# Patient Record
Sex: FEMALE | Race: WHITE | NOT HISPANIC OR LATINO | URBAN - METROPOLITAN AREA
[De-identification: names, ages, dates, MRNs, and addresses within clinical notes are randomized per-mention and may not be internally consistent; named-entity substitution may affect disease eponyms.]

---

## 2020-09-22 ENCOUNTER — TELEPHONE (OUTPATIENT)
Dept: OBSTETRICS AND GYNECOLOGY | Facility: CLINIC | Age: 50
End: 2020-09-22

## 2020-09-22 NOTE — TELEPHONE ENCOUNTER
Pt called for breast ultrasound results. Sent by STEVE Solo for L dx breast ultrasound-at [facility].     Spoke with breast imaging at [place]. Will fax results.     Verbal given-results dominant cyst L breast 3 oclock position, consistent with mammogram findings, resume regular screening mammogram, birads 2, benign.     Pt notified. She VU.

## 2021-07-06 ENCOUNTER — APPOINTMENT (OUTPATIENT)
Dept: WOMENS IMAGING | Facility: HOSPITAL | Age: 51
End: 2021-07-06

## 2021-07-06 PROCEDURE — 77063 BREAST TOMOSYNTHESIS BI: CPT | Performed by: RADIOLOGY

## 2021-07-06 PROCEDURE — 77067 SCR MAMMO BI INCL CAD: CPT | Performed by: RADIOLOGY

## 2022-07-07 ENCOUNTER — APPOINTMENT (OUTPATIENT)
Dept: WOMENS IMAGING | Facility: HOSPITAL | Age: 52
End: 2022-07-07

## 2022-07-07 PROCEDURE — 77067 SCR MAMMO BI INCL CAD: CPT | Performed by: RADIOLOGY

## 2022-07-07 PROCEDURE — 77063 BREAST TOMOSYNTHESIS BI: CPT | Performed by: RADIOLOGY

## 2024-07-26 ENCOUNTER — OFFICE VISIT (OUTPATIENT)
Age: 54
End: 2024-07-26
Payer: COMMERCIAL

## 2024-07-26 ENCOUNTER — APPOINTMENT (OUTPATIENT)
Dept: WOMENS IMAGING | Facility: HOSPITAL | Age: 54
End: 2024-07-26
Payer: COMMERCIAL

## 2024-07-26 VITALS
BODY MASS INDEX: 25.61 KG/M2 | WEIGHT: 150 LBS | SYSTOLIC BLOOD PRESSURE: 118 MMHG | HEIGHT: 64 IN | DIASTOLIC BLOOD PRESSURE: 74 MMHG

## 2024-07-26 DIAGNOSIS — I87.302 VENOUS HYPERTENSION OF LEFT LOWER EXTREMITY: Primary | ICD-10-CM

## 2024-07-26 PROCEDURE — G0279 TOMOSYNTHESIS, MAMMO: HCPCS | Performed by: RADIOLOGY

## 2024-07-26 PROCEDURE — 77065 DX MAMMO INCL CAD UNI: CPT | Performed by: RADIOLOGY

## 2024-07-26 PROCEDURE — 77061 BREAST TOMOSYNTHESIS UNI: CPT | Performed by: RADIOLOGY

## 2024-07-26 PROCEDURE — 76642 ULTRASOUND BREAST LIMITED: CPT | Performed by: RADIOLOGY

## 2024-07-26 RX ORDER — ROSUVASTATIN CALCIUM 5 MG/1
1 TABLET, COATED ORAL DAILY
COMMUNITY
Start: 2024-06-02

## 2024-07-26 RX ORDER — IBUPROFEN 200 MG
400 TABLET ORAL EVERY 6 HOURS PRN
COMMUNITY
End: 2024-07-26

## 2024-07-26 RX ORDER — LISDEXAMFETAMINE DIMESYLATE 60 MG/1
60 CAPSULE ORAL EVERY MORNING
COMMUNITY
Start: 2024-07-15

## 2024-07-26 RX ORDER — VENLAFAXINE HYDROCHLORIDE 150 MG/1
CAPSULE, EXTENDED RELEASE ORAL
COMMUNITY
Start: 2024-07-07

## 2024-07-26 RX ORDER — KETOCONAZOLE 20 MG/ML
SHAMPOO TOPICAL
COMMUNITY
Start: 2024-06-07

## 2024-07-26 NOTE — PROGRESS NOTES
"Chief Complaint  News Patient  (LLE Varicose Veins that cause cramping, pain, burning to touch. )    Subjective      History of Present Illness  Tasha Stewart presents to [facility] as a new patient with complaints of painful varicose veins in the left leg.      Past History:  Medical History: has no past medical history on file.   Surgical History: has no past surgical history on file.   Family History: family history is not on file.   Social History: reports that she has never smoked. She has never used smokeless tobacco. She reports current alcohol use of about 15.0 standard drinks of alcohol per week. She reports that she does not use drugs.    (Not in a hospital admission)     Allergies: Patient has no known allergies.     Tasha Stewart  reports that she has never smoked. She has never used smokeless tobacco..       Objective   Vital Signs:  /74   Ht 162.6 cm (64\")   Wt 68 kg (150 lb)   BMI 25.75 kg/m²   Estimated body mass index is 25.75 kg/m² as calculated from the following:    Height as of this encounter: 162.6 cm (64\").    Weight as of this encounter: 68 kg (150 lb).           Physical Exam  Vitals reviewed.   Constitutional:       Appearance: Normal appearance. She is obese.   Cardiovascular:      Rate and Rhythm: Normal rate and regular rhythm.   Pulmonary:      Breath sounds: Normal breath sounds.   Musculoskeletal:         General: Normal range of motion.   Neurological:      General: No focal deficit present.      Mental Status: She is alert.        Venous Right leg:Telangiectasia  Reticular Vein(s)  CEAP Classification right le    Venous Left leg:Telangiectasia  Reticular Vein(s)  Varicose Veins  CEAP Classification left le    Result Review :                     Assessment and Plan     Diagnoses and all orders for this visit:    1. Venous hypertension of left lower extremity (Primary)  -     Venous w Reflux Lower Extremity - Unilateral CAR; " Future    Tasha Stewart is a 53 y.o. female who presents today as a new patient with complaints of painful varicose veins to her left posterior lateral knee and thigh.  She first noticed these approximately 2 years ago and began developing discomfort approximately 3 months ago.  She describes the pain as cramping, aching, and restlessness.  This is worse with prolonged standing and traveling.  She has not yet tried compression stockings.  She has never had any surgeries or traumas to her legs.  No history of DVT or superficial thrombophlebitis.  She does report a genetic component in which her paternal grandmother, several aunts, and her daughter have all had varicose veins.  In fact her daughter has been seen in our office and has undergone several EVLA's.    Discussed the natural history and pathophysiology of venous insufficiency as well as varicose veins.  She was measured for graded compression stockings.  She is to have left lower extremity class I vein mapping and reflux study done.  The physician will then discuss the results and her treatment options.           Follow Up     Return for assigned md s/p left Class 1.  Patient was given instructions and counseling regarding her condition or for health maintenance advice. Please see specific information pulled into the AVS if appropriate.
